# Patient Record
Sex: MALE | Race: WHITE | Employment: FULL TIME | URBAN - METROPOLITAN AREA
[De-identification: names, ages, dates, MRNs, and addresses within clinical notes are randomized per-mention and may not be internally consistent; named-entity substitution may affect disease eponyms.]

---

## 2020-09-19 ENCOUNTER — HOSPITAL ENCOUNTER (EMERGENCY)
Age: 55
Discharge: ANOTHER HOSPITAL | End: 2020-09-20
Attending: INTERNAL MEDICINE
Payer: COMMERCIAL

## 2020-09-19 ENCOUNTER — APPOINTMENT (OUTPATIENT)
Dept: CT IMAGING | Age: 55
End: 2020-09-19
Payer: COMMERCIAL

## 2020-09-19 ENCOUNTER — NURSE TRIAGE (OUTPATIENT)
Dept: OTHER | Facility: CLINIC | Age: 55
End: 2020-09-19

## 2020-09-19 LAB
ALBUMIN SERPL-MCNC: 4.7 G/DL (ref 3.5–4.6)
ALP BLD-CCNC: 103 U/L (ref 35–104)
ALT SERPL-CCNC: 28 U/L (ref 0–41)
AMYLASE: 53 U/L (ref 22–93)
ANION GAP SERPL CALCULATED.3IONS-SCNC: 11 MEQ/L (ref 9–15)
AST SERPL-CCNC: 18 U/L (ref 0–40)
BACTERIA: NEGATIVE /HPF
BASOPHILS ABSOLUTE: 0.1 K/UL (ref 0–0.2)
BASOPHILS RELATIVE PERCENT: 0.5 %
BILIRUB SERPL-MCNC: 0.4 MG/DL (ref 0.2–0.7)
BILIRUBIN URINE: NEGATIVE
BLOOD, URINE: ABNORMAL
BUN BLDV-MCNC: 19 MG/DL (ref 6–20)
CALCIUM SERPL-MCNC: 9.7 MG/DL (ref 8.5–9.9)
CHLORIDE BLD-SCNC: 103 MEQ/L (ref 95–107)
CLARITY: CLEAR
CO2: 27 MEQ/L (ref 20–31)
COLOR: YELLOW
CREAT SERPL-MCNC: 0.85 MG/DL (ref 0.7–1.2)
EKG ATRIAL RATE: 80 BPM
EKG P AXIS: 64 DEGREES
EKG P-R INTERVAL: 144 MS
EKG Q-T INTERVAL: 378 MS
EKG QRS DURATION: 94 MS
EKG QTC CALCULATION (BAZETT): 435 MS
EKG R AXIS: 42 DEGREES
EKG T AXIS: 35 DEGREES
EKG VENTRICULAR RATE: 80 BPM
EOSINOPHILS ABSOLUTE: 0.3 K/UL (ref 0–0.7)
EOSINOPHILS RELATIVE PERCENT: 3 %
EPITHELIAL CELLS, UA: NORMAL /HPF
GFR AFRICAN AMERICAN: >60
GFR NON-AFRICAN AMERICAN: >60
GLOBULIN: 2.9 G/DL (ref 2.3–3.5)
GLUCOSE BLD-MCNC: 152 MG/DL (ref 70–99)
GLUCOSE URINE: NEGATIVE MG/DL
HCT VFR BLD CALC: 42.7 % (ref 42–52)
HEMOGLOBIN: 13.8 G/DL (ref 14–18)
KETONES, URINE: NEGATIVE MG/DL
LACTIC ACID: 1.5 MMOL/L (ref 0.5–2.2)
LEUKOCYTE ESTERASE, URINE: NEGATIVE
LIPASE: 26 U/L (ref 12–95)
LYMPHOCYTES ABSOLUTE: 1.8 K/UL (ref 1–4.8)
LYMPHOCYTES RELATIVE PERCENT: 15.9 %
MCH RBC QN AUTO: 29.9 PG (ref 27–31.3)
MCHC RBC AUTO-ENTMCNC: 32.2 % (ref 33–37)
MCV RBC AUTO: 92.8 FL (ref 80–100)
MONOCYTES ABSOLUTE: 1 K/UL (ref 0.2–0.8)
MONOCYTES RELATIVE PERCENT: 8.9 %
NEUTROPHILS ABSOLUTE: 8.2 K/UL (ref 1.4–6.5)
NEUTROPHILS RELATIVE PERCENT: 71.7 %
NITRITE, URINE: NEGATIVE
PDW BLD-RTO: 14 % (ref 11.5–14.5)
PH UA: 6 (ref 5–9)
PLATELET # BLD: 313 K/UL (ref 130–400)
POTASSIUM SERPL-SCNC: 4.2 MEQ/L (ref 3.4–4.9)
PROTEIN UA: NEGATIVE MG/DL
RBC # BLD: 4.61 M/UL (ref 4.7–6.1)
RBC UA: NORMAL /HPF (ref 0–2)
SODIUM BLD-SCNC: 141 MEQ/L (ref 135–144)
SPECIFIC GRAVITY UA: 1.02 (ref 1–1.03)
TOTAL PROTEIN: 7.6 G/DL (ref 6.3–8)
TROPONIN: <0.01 NG/ML (ref 0–0.01)
URINE REFLEX TO CULTURE: ABNORMAL
UROBILINOGEN, URINE: 0.2 E.U./DL
WBC # BLD: 11.4 K/UL (ref 4.8–10.8)
WBC UA: NORMAL /HPF (ref 0–5)

## 2020-09-19 PROCEDURE — 81001 URINALYSIS AUTO W/SCOPE: CPT

## 2020-09-19 PROCEDURE — 83690 ASSAY OF LIPASE: CPT

## 2020-09-19 PROCEDURE — 83605 ASSAY OF LACTIC ACID: CPT

## 2020-09-19 PROCEDURE — 82150 ASSAY OF AMYLASE: CPT

## 2020-09-19 PROCEDURE — 93005 ELECTROCARDIOGRAM TRACING: CPT

## 2020-09-19 PROCEDURE — 74177 CT ABD & PELVIS W/CONTRAST: CPT

## 2020-09-19 PROCEDURE — 80053 COMPREHEN METABOLIC PANEL: CPT

## 2020-09-19 PROCEDURE — 2580000003 HC RX 258: Performed by: INTERNAL MEDICINE

## 2020-09-19 PROCEDURE — 99285 EMERGENCY DEPT VISIT HI MDM: CPT

## 2020-09-19 PROCEDURE — 85025 COMPLETE CBC W/AUTO DIFF WBC: CPT

## 2020-09-19 PROCEDURE — 84484 ASSAY OF TROPONIN QUANT: CPT

## 2020-09-19 PROCEDURE — 96375 TX/PRO/DX INJ NEW DRUG ADDON: CPT

## 2020-09-19 PROCEDURE — 87040 BLOOD CULTURE FOR BACTERIA: CPT

## 2020-09-19 PROCEDURE — 6360000002 HC RX W HCPCS: Performed by: INTERNAL MEDICINE

## 2020-09-19 PROCEDURE — 36415 COLL VENOUS BLD VENIPUNCTURE: CPT

## 2020-09-19 RX ORDER — FLUOXETINE HYDROCHLORIDE 40 MG/1
2 CAPSULE ORAL DAILY
COMMUNITY
Start: 2020-09-14

## 2020-09-19 RX ORDER — SODIUM CHLORIDE 0.9 % (FLUSH) 0.9 %
3 SYRINGE (ML) INJECTION EVERY 8 HOURS
Status: DISCONTINUED | OUTPATIENT
Start: 2020-09-19 | End: 2020-09-20 | Stop reason: HOSPADM

## 2020-09-19 RX ORDER — 0.9 % SODIUM CHLORIDE 0.9 %
1000 INTRAVENOUS SOLUTION INTRAVENOUS ONCE
Status: COMPLETED | OUTPATIENT
Start: 2020-09-19 | End: 2020-09-20

## 2020-09-19 RX ORDER — ONDANSETRON 2 MG/ML
4 INJECTION INTRAMUSCULAR; INTRAVENOUS ONCE
Status: COMPLETED | OUTPATIENT
Start: 2020-09-19 | End: 2020-09-19

## 2020-09-19 RX ORDER — MORPHINE SULFATE 4 MG/ML
4 INJECTION, SOLUTION INTRAMUSCULAR; INTRAVENOUS ONCE
Status: COMPLETED | OUTPATIENT
Start: 2020-09-19 | End: 2020-09-19

## 2020-09-19 RX ADMIN — Medication 3 ML: at 23:28

## 2020-09-19 RX ADMIN — ONDANSETRON 4 MG: 2 INJECTION INTRAMUSCULAR; INTRAVENOUS at 23:27

## 2020-09-19 RX ADMIN — MORPHINE SULFATE 4 MG: 4 INJECTION, SOLUTION INTRAMUSCULAR; INTRAVENOUS at 23:27

## 2020-09-19 ASSESSMENT — PAIN SCALES - GENERAL
PAINLEVEL_OUTOF10: 9
PAINLEVEL_OUTOF10: 9

## 2020-09-19 ASSESSMENT — PAIN DESCRIPTION - PAIN TYPE: TYPE: ACUTE PAIN

## 2020-09-19 ASSESSMENT — PAIN DESCRIPTION - ORIENTATION: ORIENTATION: MID

## 2020-09-19 ASSESSMENT — PAIN DESCRIPTION - LOCATION: LOCATION: ABDOMEN

## 2020-09-20 ENCOUNTER — ANESTHESIA EVENT (OUTPATIENT)
Dept: OPERATING ROOM | Age: 55
DRG: 418 | End: 2020-09-20
Payer: COMMERCIAL

## 2020-09-20 ENCOUNTER — ANESTHESIA (OUTPATIENT)
Dept: OPERATING ROOM | Age: 55
DRG: 418 | End: 2020-09-20
Payer: COMMERCIAL

## 2020-09-20 ENCOUNTER — HOSPITAL ENCOUNTER (INPATIENT)
Age: 55
LOS: 1 days | Discharge: HOME | DRG: 418 | End: 2020-09-20
Attending: INTERNAL MEDICINE | Admitting: INTERNAL MEDICINE
Payer: COMMERCIAL

## 2020-09-20 ENCOUNTER — APPOINTMENT (OUTPATIENT)
Dept: GENERAL RADIOLOGY | Age: 55
DRG: 418 | End: 2020-09-20
Attending: INTERNAL MEDICINE
Payer: COMMERCIAL

## 2020-09-20 VITALS
TEMPERATURE: 100.1 F | WEIGHT: 313 LBS | DIASTOLIC BLOOD PRESSURE: 85 MMHG | BODY MASS INDEX: 41.48 KG/M2 | RESPIRATION RATE: 12 BRPM | HEART RATE: 96 BPM | OXYGEN SATURATION: 93 % | SYSTOLIC BLOOD PRESSURE: 191 MMHG | HEIGHT: 73 IN

## 2020-09-20 VITALS
HEIGHT: 73 IN | RESPIRATION RATE: 20 BRPM | HEART RATE: 84 BPM | SYSTOLIC BLOOD PRESSURE: 150 MMHG | BODY MASS INDEX: 39.76 KG/M2 | OXYGEN SATURATION: 94 % | TEMPERATURE: 98.4 F | DIASTOLIC BLOOD PRESSURE: 97 MMHG | WEIGHT: 300 LBS

## 2020-09-20 VITALS — OXYGEN SATURATION: 93 % | DIASTOLIC BLOOD PRESSURE: 85 MMHG | SYSTOLIC BLOOD PRESSURE: 130 MMHG | TEMPERATURE: 99.3 F

## 2020-09-20 PROBLEM — K81.0 ACUTE CHOLECYSTITIS: Status: ACTIVE | Noted: 2020-09-20

## 2020-09-20 LAB — SARS-COV-2, NAAT: NOT DETECTED

## 2020-09-20 PROCEDURE — 0FT44ZZ RESECTION OF GALLBLADDER, PERCUTANEOUS ENDOSCOPIC APPROACH: ICD-10-PCS | Performed by: INTERNAL MEDICINE

## 2020-09-20 PROCEDURE — 1210000000 HC MED SURG R&B

## 2020-09-20 PROCEDURE — 7100000001 HC PACU RECOVERY - ADDTL 15 MIN: Performed by: COLON & RECTAL SURGERY

## 2020-09-20 PROCEDURE — 6370000000 HC RX 637 (ALT 250 FOR IP): Performed by: COLON & RECTAL SURGERY

## 2020-09-20 PROCEDURE — 96365 THER/PROPH/DIAG IV INF INIT: CPT

## 2020-09-20 PROCEDURE — 74300 X-RAY BILE DUCTS/PANCREAS: CPT

## 2020-09-20 PROCEDURE — 6360000002 HC RX W HCPCS

## 2020-09-20 PROCEDURE — 7100000000 HC PACU RECOVERY - FIRST 15 MIN: Performed by: COLON & RECTAL SURGERY

## 2020-09-20 PROCEDURE — C1758 CATHETER, URETERAL: HCPCS | Performed by: COLON & RECTAL SURGERY

## 2020-09-20 PROCEDURE — BF121ZZ FLUOROSCOPY OF GALLBLADDER USING LOW OSMOLAR CONTRAST: ICD-10-PCS | Performed by: INTERNAL MEDICINE

## 2020-09-20 PROCEDURE — 47563 LAPARO CHOLECYSTECTOMY/GRAPH: CPT | Performed by: COLON & RECTAL SURGERY

## 2020-09-20 PROCEDURE — 2709999900 HC NON-CHARGEABLE SUPPLY: Performed by: COLON & RECTAL SURGERY

## 2020-09-20 PROCEDURE — 87040 BLOOD CULTURE FOR BACTERIA: CPT

## 2020-09-20 PROCEDURE — 6360000002 HC RX W HCPCS: Performed by: ANESTHESIOLOGY

## 2020-09-20 PROCEDURE — 6360000004 HC RX CONTRAST MEDICATION: Performed by: INTERNAL MEDICINE

## 2020-09-20 PROCEDURE — 2580000003 HC RX 258: Performed by: COLON & RECTAL SURGERY

## 2020-09-20 PROCEDURE — 6370000000 HC RX 637 (ALT 250 FOR IP)

## 2020-09-20 PROCEDURE — 3600000014 HC SURGERY LEVEL 4 ADDTL 15MIN: Performed by: COLON & RECTAL SURGERY

## 2020-09-20 PROCEDURE — G0378 HOSPITAL OBSERVATION PER HR: HCPCS

## 2020-09-20 PROCEDURE — U0002 COVID-19 LAB TEST NON-CDC: HCPCS

## 2020-09-20 PROCEDURE — 6360000004 HC RX CONTRAST MEDICATION: Performed by: COLON & RECTAL SURGERY

## 2020-09-20 PROCEDURE — 3700000000 HC ANESTHESIA ATTENDED CARE: Performed by: COLON & RECTAL SURGERY

## 2020-09-20 PROCEDURE — 3700000001 HC ADD 15 MINUTES (ANESTHESIA): Performed by: COLON & RECTAL SURGERY

## 2020-09-20 PROCEDURE — 6360000002 HC RX W HCPCS: Performed by: INTERNAL MEDICINE

## 2020-09-20 PROCEDURE — 2580000003 HC RX 258: Performed by: INTERNAL MEDICINE

## 2020-09-20 PROCEDURE — 88304 TISSUE EXAM BY PATHOLOGIST: CPT

## 2020-09-20 PROCEDURE — 96376 TX/PRO/DX INJ SAME DRUG ADON: CPT

## 2020-09-20 PROCEDURE — 3600000004 HC SURGERY LEVEL 4 BASE: Performed by: COLON & RECTAL SURGERY

## 2020-09-20 PROCEDURE — 2500000003 HC RX 250 WO HCPCS: Performed by: ANESTHESIOLOGY

## 2020-09-20 PROCEDURE — G0379 DIRECT REFER HOSPITAL OBSERV: HCPCS

## 2020-09-20 PROCEDURE — 99253 IP/OBS CNSLTJ NEW/EST LOW 45: CPT | Performed by: COLON & RECTAL SURGERY

## 2020-09-20 PROCEDURE — 76942 ECHO GUIDE FOR BIOPSY: CPT | Performed by: ANESTHESIOLOGY

## 2020-09-20 PROCEDURE — 93010 ELECTROCARDIOGRAM REPORT: CPT | Performed by: INTERNAL MEDICINE

## 2020-09-20 PROCEDURE — 96375 TX/PRO/DX INJ NEW DRUG ADDON: CPT

## 2020-09-20 RX ORDER — BUPIVACAINE HYDROCHLORIDE 2.5 MG/ML
INJECTION, SOLUTION EPIDURAL; INFILTRATION; INTRACAUDAL PRN
Status: DISCONTINUED | OUTPATIENT
Start: 2020-09-20 | End: 2020-09-20 | Stop reason: SDUPTHER

## 2020-09-20 RX ORDER — FENTANYL CITRATE 50 UG/ML
25 INJECTION, SOLUTION INTRAMUSCULAR; INTRAVENOUS EVERY 10 MIN PRN
Status: DISCONTINUED | OUTPATIENT
Start: 2020-09-20 | End: 2020-09-20 | Stop reason: HOSPADM

## 2020-09-20 RX ORDER — SODIUM CHLORIDE, SODIUM LACTATE, POTASSIUM CHLORIDE, CALCIUM CHLORIDE 600; 310; 30; 20 MG/100ML; MG/100ML; MG/100ML; MG/100ML
INJECTION, SOLUTION INTRAVENOUS CONTINUOUS
Status: DISCONTINUED | OUTPATIENT
Start: 2020-09-20 | End: 2020-09-20 | Stop reason: HOSPADM

## 2020-09-20 RX ORDER — METOCLOPRAMIDE HYDROCHLORIDE 5 MG/ML
10 INJECTION INTRAMUSCULAR; INTRAVENOUS
Status: DISCONTINUED | OUTPATIENT
Start: 2020-09-20 | End: 2020-09-20 | Stop reason: HOSPADM

## 2020-09-20 RX ORDER — HYDROCODONE BITARTRATE AND ACETAMINOPHEN 5; 325 MG/1; MG/1
1 TABLET ORAL PRN
Status: DISCONTINUED | OUTPATIENT
Start: 2020-09-20 | End: 2020-09-20 | Stop reason: HOSPADM

## 2020-09-20 RX ORDER — MIDAZOLAM HYDROCHLORIDE 1 MG/ML
INJECTION INTRAMUSCULAR; INTRAVENOUS
Status: DISCONTINUED
Start: 2020-09-20 | End: 2020-09-20 | Stop reason: HOSPADM

## 2020-09-20 RX ORDER — DEXAMETHASONE SODIUM PHOSPHATE 4 MG/ML
INJECTION, SOLUTION INTRA-ARTICULAR; INTRALESIONAL; INTRAMUSCULAR; INTRAVENOUS; SOFT TISSUE PRN
Status: DISCONTINUED | OUTPATIENT
Start: 2020-09-20 | End: 2020-09-20 | Stop reason: SDUPTHER

## 2020-09-20 RX ORDER — HYDROCODONE BITARTRATE AND ACETAMINOPHEN 5; 325 MG/1; MG/1
2 TABLET ORAL PRN
Status: DISCONTINUED | OUTPATIENT
Start: 2020-09-20 | End: 2020-09-20 | Stop reason: HOSPADM

## 2020-09-20 RX ORDER — LIDOCAINE HYDROCHLORIDE AND EPINEPHRINE BITARTRATE 20; .01 MG/ML; MG/ML
INJECTION, SOLUTION SUBCUTANEOUS PRN
Status: DISCONTINUED | OUTPATIENT
Start: 2020-09-20 | End: 2020-09-20 | Stop reason: SDUPTHER

## 2020-09-20 RX ORDER — OXYCODONE HYDROCHLORIDE AND ACETAMINOPHEN 5; 325 MG/1; MG/1
2 TABLET ORAL EVERY 4 HOURS PRN
Status: DISCONTINUED | OUTPATIENT
Start: 2020-09-20 | End: 2020-09-20 | Stop reason: HOSPADM

## 2020-09-20 RX ORDER — ONDANSETRON 2 MG/ML
4 INJECTION INTRAMUSCULAR; INTRAVENOUS
Status: DISCONTINUED | OUTPATIENT
Start: 2020-09-20 | End: 2020-09-20 | Stop reason: HOSPADM

## 2020-09-20 RX ORDER — ONDANSETRON 2 MG/ML
4 INJECTION INTRAMUSCULAR; INTRAVENOUS ONCE
Status: COMPLETED | OUTPATIENT
Start: 2020-09-20 | End: 2020-09-20

## 2020-09-20 RX ORDER — MORPHINE SULFATE 4 MG/ML
INJECTION, SOLUTION INTRAMUSCULAR; INTRAVENOUS
Status: COMPLETED
Start: 2020-09-20 | End: 2020-09-20

## 2020-09-20 RX ORDER — ROCURONIUM BROMIDE 10 MG/ML
INJECTION, SOLUTION INTRAVENOUS PRN
Status: DISCONTINUED | OUTPATIENT
Start: 2020-09-20 | End: 2020-09-20 | Stop reason: SDUPTHER

## 2020-09-20 RX ORDER — DIPHENHYDRAMINE HYDROCHLORIDE 50 MG/ML
12.5 INJECTION INTRAMUSCULAR; INTRAVENOUS
Status: DISCONTINUED | OUTPATIENT
Start: 2020-09-20 | End: 2020-09-20 | Stop reason: HOSPADM

## 2020-09-20 RX ORDER — LIDOCAINE HYDROCHLORIDE 20 MG/ML
INJECTION, SOLUTION INTRAVENOUS PRN
Status: DISCONTINUED | OUTPATIENT
Start: 2020-09-20 | End: 2020-09-20 | Stop reason: SDUPTHER

## 2020-09-20 RX ORDER — FLUOXETINE HYDROCHLORIDE 20 MG/1
80 CAPSULE ORAL DAILY
Status: DISCONTINUED | OUTPATIENT
Start: 2020-09-20 | End: 2020-09-20 | Stop reason: HOSPADM

## 2020-09-20 RX ORDER — MIDAZOLAM HYDROCHLORIDE 1 MG/ML
INJECTION INTRAMUSCULAR; INTRAVENOUS PRN
Status: DISCONTINUED | OUTPATIENT
Start: 2020-09-20 | End: 2020-09-20 | Stop reason: SDUPTHER

## 2020-09-20 RX ORDER — PROPOFOL 10 MG/ML
INJECTION, EMULSION INTRAVENOUS PRN
Status: DISCONTINUED | OUTPATIENT
Start: 2020-09-20 | End: 2020-09-20 | Stop reason: SDUPTHER

## 2020-09-20 RX ORDER — KETOROLAC TROMETHAMINE 30 MG/ML
30 INJECTION, SOLUTION INTRAMUSCULAR; INTRAVENOUS EVERY 6 HOURS PRN
Status: DISCONTINUED | OUTPATIENT
Start: 2020-09-22 | End: 2020-09-20 | Stop reason: HOSPADM

## 2020-09-20 RX ORDER — ONDANSETRON 2 MG/ML
INJECTION INTRAMUSCULAR; INTRAVENOUS PRN
Status: DISCONTINUED | OUTPATIENT
Start: 2020-09-20 | End: 2020-09-20 | Stop reason: SDUPTHER

## 2020-09-20 RX ORDER — MAGNESIUM HYDROXIDE 1200 MG/15ML
LIQUID ORAL CONTINUOUS PRN
Status: COMPLETED | OUTPATIENT
Start: 2020-09-20 | End: 2020-09-20

## 2020-09-20 RX ORDER — MORPHINE SULFATE 4 MG/ML
4 INJECTION, SOLUTION INTRAMUSCULAR; INTRAVENOUS ONCE
Status: COMPLETED | OUTPATIENT
Start: 2020-09-20 | End: 2020-09-20

## 2020-09-20 RX ORDER — CIPROFLOXACIN 2 MG/ML
400 INJECTION, SOLUTION INTRAVENOUS ONCE
Status: DISCONTINUED | OUTPATIENT
Start: 2020-09-20 | End: 2020-09-20

## 2020-09-20 RX ORDER — MORPHINE SULFATE 2 MG/ML
2 INJECTION, SOLUTION INTRAMUSCULAR; INTRAVENOUS EVERY 4 HOURS PRN
Status: DISCONTINUED | OUTPATIENT
Start: 2020-09-20 | End: 2020-09-20 | Stop reason: HOSPADM

## 2020-09-20 RX ORDER — AMOXICILLIN AND CLAVULANATE POTASSIUM 500; 125 MG/1; MG/1
1 TABLET, FILM COATED ORAL 3 TIMES DAILY
Qty: 30 TABLET | Refills: 0 | Status: SHIPPED | OUTPATIENT
Start: 2020-09-20 | End: 2020-09-30

## 2020-09-20 RX ORDER — OXYCODONE HYDROCHLORIDE AND ACETAMINOPHEN 5; 325 MG/1; MG/1
1 TABLET ORAL EVERY 4 HOURS PRN
Status: DISCONTINUED | OUTPATIENT
Start: 2020-09-20 | End: 2020-09-20 | Stop reason: HOSPADM

## 2020-09-20 RX ORDER — ONDANSETRON 2 MG/ML
4 INJECTION INTRAMUSCULAR; INTRAVENOUS EVERY 6 HOURS PRN
Status: DISCONTINUED | OUTPATIENT
Start: 2020-09-20 | End: 2020-09-20 | Stop reason: HOSPADM

## 2020-09-20 RX ORDER — OXYCODONE HYDROCHLORIDE AND ACETAMINOPHEN 5; 325 MG/1; MG/1
1 TABLET ORAL EVERY 6 HOURS PRN
Qty: 12 TABLET | Refills: 0 | Status: SHIPPED | OUTPATIENT
Start: 2020-09-20 | End: 2020-09-23

## 2020-09-20 RX ORDER — MEPERIDINE HYDROCHLORIDE 25 MG/ML
12.5 INJECTION INTRAMUSCULAR; INTRAVENOUS; SUBCUTANEOUS EVERY 5 MIN PRN
Status: DISCONTINUED | OUTPATIENT
Start: 2020-09-20 | End: 2020-09-20 | Stop reason: HOSPADM

## 2020-09-20 RX ADMIN — MORPHINE SULFATE 2 MG: 2 INJECTION, SOLUTION INTRAMUSCULAR; INTRAVENOUS at 09:10

## 2020-09-20 RX ADMIN — ONDANSETRON 4 MG: 2 INJECTION INTRAMUSCULAR; INTRAVENOUS at 13:43

## 2020-09-20 RX ADMIN — LIDOCAINE HYDROCHLORIDE,EPINEPHRINE BITARTRATE 15 ML: 20; .01 INJECTION, SOLUTION INFILTRATION; PERINEURAL at 11:54

## 2020-09-20 RX ADMIN — DEXAMETHASONE SODIUM PHOSPHATE 4 MG: 4 INJECTION INTRA-ARTICULAR; INTRALESIONAL; INTRAMUSCULAR; INTRAVENOUS; SOFT TISSUE at 11:54

## 2020-09-20 RX ADMIN — SODIUM CHLORIDE 1000 ML: 9 INJECTION, SOLUTION INTRAVENOUS at 00:47

## 2020-09-20 RX ADMIN — LIDOCAINE HYDROCHLORIDE 40 MG: 20 INJECTION, SOLUTION INTRAVENOUS at 12:55

## 2020-09-20 RX ADMIN — IOPAMIDOL 100 ML: 755 INJECTION, SOLUTION INTRAVENOUS at 00:07

## 2020-09-20 RX ADMIN — MIDAZOLAM HYDROCHLORIDE 2 MG: 2 INJECTION, SOLUTION INTRAMUSCULAR; INTRAVENOUS at 11:52

## 2020-09-20 RX ADMIN — HYDROMORPHONE HYDROCHLORIDE 0.5 MG: 1 INJECTION, SOLUTION INTRAMUSCULAR; INTRAVENOUS; SUBCUTANEOUS at 01:35

## 2020-09-20 RX ADMIN — PROPOFOL 200 MG: 10 INJECTION, EMULSION INTRAVENOUS at 12:55

## 2020-09-20 RX ADMIN — BUPIVACAINE HYDROCHLORIDE 30 ML: 2.5 INJECTION, SOLUTION EPIDURAL; INFILTRATION; INTRACAUDAL at 11:54

## 2020-09-20 RX ADMIN — DEXAMETHASONE SODIUM PHOSPHATE 4 MG: 4 INJECTION INTRA-ARTICULAR; INTRALESIONAL; INTRAMUSCULAR; INTRAVENOUS; SOFT TISSUE at 13:16

## 2020-09-20 RX ADMIN — MIDAZOLAM HYDROCHLORIDE 2 MG: 2 INJECTION, SOLUTION INTRAMUSCULAR; INTRAVENOUS at 11:47

## 2020-09-20 RX ADMIN — ROCURONIUM BROMIDE 50 MG: 10 INJECTION INTRAVENOUS at 12:55

## 2020-09-20 RX ADMIN — PIPERACILLIN SODIUM AND TAZOBACTAM SODIUM 3.38 G: 3; .375 INJECTION, POWDER, LYOPHILIZED, FOR SOLUTION INTRAVENOUS at 11:07

## 2020-09-20 RX ADMIN — PIPERACILLIN AND TAZOBACTAM 3.38 G: 3; .375 INJECTION, POWDER, FOR SOLUTION INTRAVENOUS at 01:18

## 2020-09-20 RX ADMIN — FENTANYL CITRATE 25 MCG: 50 INJECTION, SOLUTION INTRAMUSCULAR; INTRAVENOUS at 14:51

## 2020-09-20 RX ADMIN — MORPHINE SULFATE 2 MG: 2 INJECTION, SOLUTION INTRAMUSCULAR; INTRAVENOUS at 04:54

## 2020-09-20 RX ADMIN — MORPHINE SULFATE 4 MG: 4 INJECTION, SOLUTION INTRAMUSCULAR; INTRAVENOUS at 00:47

## 2020-09-20 RX ADMIN — ONDANSETRON 4 MG: 2 INJECTION INTRAMUSCULAR; INTRAVENOUS at 00:47

## 2020-09-20 RX ADMIN — SODIUM CHLORIDE, POTASSIUM CHLORIDE, SODIUM LACTATE AND CALCIUM CHLORIDE: 600; 310; 30; 20 INJECTION, SOLUTION INTRAVENOUS at 12:47

## 2020-09-20 RX ADMIN — SODIUM CHLORIDE, POTASSIUM CHLORIDE, SODIUM LACTATE AND CALCIUM CHLORIDE: 600; 310; 30; 20 INJECTION, SOLUTION INTRAVENOUS at 04:55

## 2020-09-20 ASSESSMENT — PULMONARY FUNCTION TESTS
PIF_VALUE: 0
PIF_VALUE: 18
PIF_VALUE: 30
PIF_VALUE: 3
PIF_VALUE: 20
PIF_VALUE: 29
PIF_VALUE: 32
PIF_VALUE: 30
PIF_VALUE: 15
PIF_VALUE: 30
PIF_VALUE: 30
PIF_VALUE: 0
PIF_VALUE: 29
PIF_VALUE: 18
PIF_VALUE: 22
PIF_VALUE: 1
PIF_VALUE: 28
PIF_VALUE: 29
PIF_VALUE: 31
PIF_VALUE: 29
PIF_VALUE: 28
PIF_VALUE: 28
PIF_VALUE: 21
PIF_VALUE: 35
PIF_VALUE: 30
PIF_VALUE: 33
PIF_VALUE: 27
PIF_VALUE: 28
PIF_VALUE: 18
PIF_VALUE: 31
PIF_VALUE: 0
PIF_VALUE: 4
PIF_VALUE: 0
PIF_VALUE: 29
PIF_VALUE: 31
PIF_VALUE: 19
PIF_VALUE: 1
PIF_VALUE: 0
PIF_VALUE: 32
PIF_VALUE: 32
PIF_VALUE: 31
PIF_VALUE: 20
PIF_VALUE: 32
PIF_VALUE: 26
PIF_VALUE: 17
PIF_VALUE: 1
PIF_VALUE: 31
PIF_VALUE: 28
PIF_VALUE: 31
PIF_VALUE: 0
PIF_VALUE: 0
PIF_VALUE: 29
PIF_VALUE: 23
PIF_VALUE: 18
PIF_VALUE: 29
PIF_VALUE: 35
PIF_VALUE: 18
PIF_VALUE: 30
PIF_VALUE: 13
PIF_VALUE: 1
PIF_VALUE: 18
PIF_VALUE: 30
PIF_VALUE: 31
PIF_VALUE: 31
PIF_VALUE: 29
PIF_VALUE: 32
PIF_VALUE: 5
PIF_VALUE: 32
PIF_VALUE: 0
PIF_VALUE: 32
PIF_VALUE: 30
PIF_VALUE: 28
PIF_VALUE: 32
PIF_VALUE: 26
PIF_VALUE: 31
PIF_VALUE: 28
PIF_VALUE: 31
PIF_VALUE: 33
PIF_VALUE: 29

## 2020-09-20 ASSESSMENT — ENCOUNTER SYMPTOMS
VOMITING: 1
EYE DISCHARGE: 0
APNEA: 0
ABDOMINAL PAIN: 1
NAUSEA: 1

## 2020-09-20 ASSESSMENT — PAIN SCALES - GENERAL
PAINLEVEL_OUTOF10: 10
PAINLEVEL_OUTOF10: 4
PAINLEVEL_OUTOF10: 4
PAINLEVEL_OUTOF10: 9
PAINLEVEL_OUTOF10: 9
PAINLEVEL_OUTOF10: 0
PAINLEVEL_OUTOF10: 9

## 2020-09-20 ASSESSMENT — PAIN DESCRIPTION - PAIN TYPE: TYPE: SURGICAL PAIN

## 2020-09-20 ASSESSMENT — PAIN - FUNCTIONAL ASSESSMENT: PAIN_FUNCTIONAL_ASSESSMENT: 0-10

## 2020-09-20 ASSESSMENT — PAIN DESCRIPTION - LOCATION: LOCATION: ABDOMEN

## 2020-09-20 ASSESSMENT — PAIN DESCRIPTION - ORIENTATION: ORIENTATION: RIGHT

## 2020-09-20 NOTE — PROGRESS NOTES
Agree with plan of care as documented by my colleague today at 4:44 AM.  Patient was taken to the OR for cholecystectomy. We will continue to follow tomorrow.

## 2020-09-20 NOTE — PROGRESS NOTES
Pt in bed resting. Admission documented. Medicated for pain per MAR. Oriented to room. VSS. No sx of distressed noted at this time. A&O*4. Ambulating well in to bed. No needs at this time. Will continue to monitor.  Electronically signed by El Fonseca RN on 9/20/2020 at 5:37 AM

## 2020-09-20 NOTE — PLAN OF CARE
Problem: Pain:  Goal: Pain level will decrease  Description: Pain level will decrease  9/20/2020 0827 by Wes Hobson RN  Outcome: Ongoing  9/20/2020 0539 by Thalia Elaine RN  Outcome: Ongoing  Goal: Control of acute pain  Description: Control of acute pain  9/20/2020 0539 by Thalia Elaine RN  Outcome: Ongoing  Goal: Control of chronic pain  Description: Control of chronic pain  9/20/2020 0827 by Wes Hobson RN  Outcome: Ongoing  9/20/2020 0539 by Thalia Elaine RN  Outcome: Ongoing

## 2020-09-20 NOTE — PROGRESS NOTES
After appropriate time out, patient was assisted to sit on edge of bed. Appropriate monitors were applied, as well as nasal 02 @ 3 LPM.  Dr. Cortez Lang then performed bilateral erector spinae plane blocks. Patient tolerated well, remaining fairly awake despite IV Versed sedation. Pulse oximeter readings remained 96 - 98 % throughout, as well as HR in the 90's - 100 throughout. Continuing to monitor while awaiting OR.

## 2020-09-20 NOTE — CONSULTS
Department of General Surgery - Adult  Surgical Service general surgery  Attending Consult Note      Reason for Consult: Acute cholecystitis      CHIEF COMPLAINT: Right upper quadrant pain    History Obtained From:  patient, electronic medical record    HISTORY OF PRESENT ILLNESS:                The patient is a 50 y.o. male who presents with right upper quadrant pain for the past few days it started periumbilically and has. Radiated up to the right upper quadrant. He was seen at [facility] emergency room last night. A CAT scan showed a large gallstone along with a distended gallbladder consistent with acute cholecystitis. He has had no abdominal operations in the past.  He complains of nausea and vomiting last night. He is otherwise healthy. He has a history of joint replacement. He has had kidney stones in the past.        Past Medical History:        Diagnosis Date    Kidney stone      Past Surgical History:        Procedure Laterality Date    JOINT REPLACEMENT Bilateral     knees, left hip     Current Medications:   Current Facility-Administered Medications: ondansetron (ZOFRAN) injection 4 mg, 4 mg, Intravenous, Q6H PRN  piperacillin-tazobactam (ZOSYN) 3.375 g in dextrose 5 % 50 mL IVPB extended infusion (mini-bag), 3.375 g, Intravenous, Q8H  morphine (PF) injection 2 mg, 2 mg, Intravenous, Q4H PRN  lactated ringers infusion, , Intravenous, Continuous  enoxaparin (LOVENOX) injection 40 mg, 40 mg, Subcutaneous, Daily  Allergies:  Patient has no known allergies. Social History:   TOBACCO:   reports that he has never smoked. He has quit using smokeless tobacco.  His smokeless tobacco use included chew. ETOH:   reports previous alcohol use. DRUGS:   reports no history of drug use. Family History:   No family history on file.     REVIEW OF SYSTEMS:    CONSTITUTIONAL:  positive for  malaise  EYES:  negative  HEENT:  negative  RESPIRATORY:  negative  CARDIOVASCULAR:  negative  GASTROINTESTINAL:  positive LABGLOM >60.0 09/19/2020    GLUCOSE 152 09/19/2020    PROT 7.6 09/19/2020    LABALBU 4.7 09/19/2020    CALCIUM 9.7 09/19/2020    BILITOT 0.4 09/19/2020    ALKPHOS 103 09/19/2020    AST 18 09/19/2020    ALT 28 09/19/2020     Radiology Review: CAT scan reviewed as described above    IMPRESSION/RECOMMENDATIONS:      Acute cholecystitis secondary to gallstones. Ultrasound would not give me any additional information required for operative planning. Risks and benefits of laparoscopic possible open cholecystectomy described. Risks of the procedure including infection, bleeding, damage to the common bile duct, bile leak reoperation and failure to relieve symptoms all addressed. His wife was present during our discussion. Consent was obtained. All questions were answered. Plan for OR later this morning for cholecystectomy    The patient was counseled at length about the risks of brisa Covid-19 during their perioperative period and any recovery window from their procedure. The patient was made aware that brisa Covid-19  may worsen their prognosis for recovering from their procedure  and lend to a higher morbidity and/or mortality risk. All material risks, benefits, and reasonable alternatives including postponing the procedure were discussed. The patient does wish to proceed with the procedure at this time.

## 2020-09-20 NOTE — BRIEF OP NOTE
Brief Postoperative Note      Patient: Gini Azar  YOB: 1965  MRN: [id number]    Date of Procedure: 9/20/2020    Pre-Op Diagnosis: Acute cholecystitis    Post-Op Diagnosis: Same       Procedure(s):  CHOLECYSTECTOMY LAPAROSCOPIC WITH INTROPERTIVE CHOLANGIOGRAM    Surgeon(s):  Anand Watson MD    Assistant:  First Assistant: Deedee Magana    Anesthesia: General    Estimated Blood Loss (mL): 50    Complications: None    Specimens:   ID Type Source Tests Collected by Time Destination   A :  Tissue Gallbladder SURGICAL PATHOLOGY Anand Watson MD 9/20/2020 1324        Implants:  * No implants in log *      Drains: * No LDAs found *    Findings: Acute cholecystitis, intraoperative cholangiogram unremarkable    Electronically signed by Tiburcio Houser MD on 9/20/2020 at 2:11 PM

## 2020-09-20 NOTE — DISCHARGE SUMMARY
Hospital Medicine Discharge Summary    Collette Fireman  :  1965  MRN:  [id number]    Admit date:  2020  Discharge date:  2020    Admitting Physician:  Tono Qiu MD  Primary Care Physician:  Prateek Katz MD      Discharge Diagnoses:      Acute cholecystitis  Mild diverticulitis       Hospital Course:       Procedure(s):  CHOLECYSTECTOMY LAPAROSCOPIC WITH INTROPERTIVE CHOLANGIOGRAM     Surgeon(s):  Mario Briceno MD    IV abx    dc'ed on augmentin given possible mild acute diverticulitis. Pt is to follow up with surgery as outpatient. Exam on discharge:   BP (!) 191/85   Pulse 96   Temp 100.1 °F (37.8 °C) (Temporal)   Resp 12   Ht 6' 1\" (1.854 m)   Wt (!) 313 lb (142 kg)   SpO2 93%   BMI 41.30 kg/m²   See h/p note from Dr Mario Briceno     Patient was seen by the following consultants while admitted to [facility]:   Consults:  [facility]    Significant Diagnostic Studies:    Refer to chart     Please refer to chart if no studies are shown here    Ct Abdomen Pelvis W Iv Contrast Additional Contrast? None    Result Date: 2020  EXAM:  CT ABDOMEN PELVIS W IV CONTRAST History: Abdominal pain. Epigastric pain. Vomiting. Technique: Multiple contiguous axial images were obtained of the abdomen and pelvis from an level of the lung bases through the ischial tuberosities with contrast. Multiplanar reformats were obtained. Delayed images were obtained. Comparison: CT abdomen pelvis from 2016 Findings: Lung bases are clear. Evidence of coronary artery disease. The liver is enlarged measuring 24 cm in craniocaudal length. The gallbladder is mildly distended. A partially calcified 2.4 cm gallstone is identified within the lumen of the gallbladder. No gallbladder wall thickening or pericholecystic fluid. The pancreas, spleen, stomach, and adrenal glands are within normal limits. The kidneys enhance uniformly.  Multiple left renal calculi identified, largest measuring approximately 9 mm. A punctate nonobstructing right renal calculus is identified. No hydronephrosis. Urinary bladder is well distended. The prostate is enlarged. Abdominal aorta is nonaneurysmal  . No retroperitoneal or abdominal/pelvic lymphadenopathy. No small bowel obstruction. Colonic diverticuli are identified, most significantly involving the sigmoid colon. There is wall thickening and minimal pericolic inflammation of the sigmoid colon suggesting diverticulitis. Appendix is within normal limits. No free fluid or free air. Postsurgical changes of left hip arthroplasty. Degenerative changes of the spine. Suggestion of mild sigmoid colon diverticulitis. No perforation or abscess formation. Nonspecific mild dilation of the gallbladder which contains a 2.4 cm partially calcified gallstone. No gallbladder wall thickening or pericholecystic fluid. Nonobstructing bilateral renal calculi. Mildly enlarged prostate. All CT scans at this facility use dose modulation, iterative reconstruction, and/or weight based dosing when appropriate to reduce radiation dose to as low as reasonably achievable. Fl Cholangiogram Or    Result Date: 9/20/2020  FL CHOLANGIOGRAM OR : 9/20/2020 1:24 PM CLINICAL HISTORY: Acute cholecystitis. COMPARISON: None available. Intraoperative fluoroscopy was provided for Dr. Caty Brown procedure. A total of 16.573 mGy of fluoroscopy was used, with 1 fluoroscopic still saved. No diagnostic images were obtained. Please see Dr. Caty Brown surgical notes for complete details.        Discharge Medications:       [facility] Medication Instructions HVY:[id number]    Printed on:09/20/20 1811   Medication Information                      amoxicillin-clavulanate (AUGMENTIN) 500-125 MG per tablet  Take 1 tablet by mouth 3 times daily for 10 days             FLUoxetine (PROZAC) 40 MG capsule  Take 2 capsules by mouth daily             oxyCODONE-acetaminophen (PERCOCET) 5-325 MG per tablet  Take 1 tablet by mouth every 6 hours as needed for Pain for up to 3 days. Intended supply: 3 days. Take lowest dose possible to manage pain                 Disposition:   If discharged to Home, Any [facility] needs that were indicated and/or required as been addressed and set up by Social Work. Condition at discharge: good     Activity: activity as tolerated    Total time taken for discharging this patient: 40 minutes. Greater than 70% of time was spent focused exclusively on this patient. Time was taken to review chart, discuss plans with consultants, reconciling medications, discussing plan answering questions with patient.      Ange Nuñez  9/20/2020, 6:11 PM  ----------------------------------------------------------------------------------------------------------------------    Chevy Ibanez

## 2020-09-20 NOTE — TELEPHONE ENCOUNTER
Reason for Disposition   [1] SEVERE pain (e.g., excruciating) AND [2] present > 1 hour    Answer Assessment - Initial Assessment Questions  1. LOCATION: \"Where does it hurt? \"       Center abdomen below the rib cage   2. RADIATION: \"Does the pain shoot anywhere else? \" (e.g., chest, back)    None  3. ONSET: \"When did the pain begin? \" (Minutes, hours or days ago)       1-2 pm   4. SUDDEN: \"Gradual or sudden onset? \"     Sudden   5. PATTERN \"Does the pain come and go, or is it constant? \"     - If constant: \"Is it getting better, staying the same, or worsening? \"       (Note: Constant means the pain never goes away completely; most serious pain is constant and it progresses)      - If intermittent: \"How long does it last?\" \"Do you have pain now? \"      (Note: Intermittent means the pain goes away completely between bouts)      Constant   6. SEVERITY: \"How bad is the pain? \"  (e.g., Scale 1-10; mild, moderate, or severe)     - MILD (1-3): doesn't interfere with normal activities, abdomen soft and not tender to touch      - MODERATE (4-7): interferes with normal activities or awakens from sleep, tender to touch      - SEVERE (8-10): excruciating pain, doubled over, unable to do any normal activities        9/10  7. RECURRENT SYMPTOM: \"Have you ever had this type of abdominal pain before? \" If so, ask: \"When was the last time? \" and \"What happened that time? \"       No  8. CAUSE: \"What do you think is causing the abdominal pain? \"      Unsure   9. RELIEVING/AGGRAVATING FACTORS: \"What makes it better or worse? \" (e.g., movement, antacids, bowel movement)      Vomiting   10. OTHER SYMPTOMS: \"Has there been any vomiting, diarrhea, constipation, or urine problems? \"       Vomiting     Caller stated he developed abdominal pain this afternoon around 1-2 pm and took some pepto-bismol, which mildly helped but the pain returned after he got home.  The pain has increased in the last three hours, which led to vomiting and the vomiting helped slightly.     Protocols used: ABDOMINAL PAIN - MALE-ADULT-AH

## 2020-09-20 NOTE — ANESTHESIA POSTPROCEDURE EVALUATION
Department of Anesthesiology  Postprocedure Note    Patient: Germain Mckeon  MRN: [id number]  YOB: 1965  Date of evaluation: 9/20/2020  Time:  2:12 PM     Procedure Summary     Date:  09/20/20 Room / Location:  [address]    Anesthesia Start:  8380 Anesthesia Stop:  7629    Procedure:  CHOLECYSTECTOMY LAPAROSCOPIC WITH INTROPERTIVE CHOLANGIOGRAM (N/A ) Diagnosis:  (Gallbladder)    Surgeon:  Padmini Cobian MD Responsible Provider:  Ruslan Parikh MD    Anesthesia Type:  general, regional ASA Status:  2 - Emergent          Anesthesia Type: general, regional    Prieto Phase I:      Prieto Phase II:      Last vitals: Reviewed and per EMR flowsheets.        Anesthesia Post Evaluation    Patient location during evaluation: PACU  Patient participation: complete - patient participated  Level of consciousness: awake and alert  Pain score: 0  Airway patency: patent  Nausea & Vomiting: no vomiting and no nausea  Complications: no  Cardiovascular status: hemodynamically stable  Respiratory status: face mask  Hydration status: stable

## 2020-09-20 NOTE — PROGRESS NOTES
Report called to [facility]. Patient is very talkative, requesting food & oral intake. Condition is stable; will arrange for transport to return to 4 room. Has been taking ice chips & sips of H20 in PACU; will progress to sips of H20 now.

## 2020-09-20 NOTE — PROGRESS NOTES
Pt assessment and vitals complete and stable. Patient up to chair independently. Pt medicated for pain per EMAR, voiced relief. Pt NPO at this time.  Will continue to monitor

## 2020-09-20 NOTE — ANESTHESIA PRE PROCEDURE
Known Allergies    Problem List:    Patient Active Problem List   Diagnosis Code    Acute cholecystitis K81.0       Past Medical History:        Diagnosis Date    Kidney stone        Past Surgical History:        Procedure Laterality Date    JOINT REPLACEMENT Bilateral     knees, left hip       Social History:    Social History     Tobacco Use    Smoking status: Never Smoker    Smokeless tobacco: Former User     Types: Chew   Substance Use Topics    Alcohol use: Not Currently     Comment: socially                                Counseling given: Not Answered      Vital Signs (Current):   Vitals:    09/20/20 0515 09/20/20 0715 09/20/20 0915 09/20/20 1127   BP: (!) 141/89 (!) 151/96 (!) 154/97 (!) 143/90   Pulse: 90 96 90 93   Resp: 18 18 20   Temp: 97.2 °F (36.2 °C) 97.9 °F (36.6 °C)  98.5 °F (36.9 °C)   TempSrc: Oral Oral  Temporal   SpO2: 93% 95%  94%   Weight: (!) 313 lb (142 kg)      Height: 6' 1\" (1.854 m)                                                 BP Readings from Last 3 Encounters:   09/20/20 (!) 143/90   09/20/20 (!) 150/97       NPO Status: Time of last liquid consumption: 0330                        Time of last solid consumption: 1700                        Date of last liquid consumption: 09/20/20                        Date of last solid food consumption: 09/19/20    BMI:   Wt Readings from Last 3 Encounters:   09/20/20 (!) 313 lb (142 kg)   09/19/20 300 lb (136.1 kg)     Body mass index is 41.3 kg/m².     CBC:   Lab Results   Component Value Date    WBC 11.4 09/19/2020    RBC 4.61 09/19/2020    HGB 13.8 09/19/2020    HCT 42.7 09/19/2020    MCV 92.8 09/19/2020    RDW 14.0 09/19/2020     09/19/2020       CMP:   Lab Results   Component Value Date     09/19/2020    K 4.2 09/19/2020     09/19/2020    CO2 27 09/19/2020    BUN 19 09/19/2020    CREATININE 0.85 09/19/2020    GFRAA >60.0 09/19/2020    LABGLOM >60.0 09/19/2020    GLUCOSE 152 09/19/2020    PROT 7.6 09/19/2020

## 2020-09-20 NOTE — PROGRESS NOTES
Dr. Valencia Patch vs patient at bedside in PACU; provided paper Rx for percocet, which was placed inside chart. He also called patient's wife, Valerie Rowland, and spoke with her regarding procedure & POC.

## 2020-09-20 NOTE — ED NOTES
TC contacted to initiate transfer to ED [facility].       Shanta Perales, RN  09/20/20 0421

## 2020-09-20 NOTE — ED PROVIDER NOTES
[facility] ED  EMERGENCY DEPARTMENT ENCOUNTER      Pt Name: Bryce Pinzon  MRN: [id number]  [place] 1965  Date of evaluation: 9/19/2020  Provider: Hermina Halsted, MD    CHIEF COMPLAINT       Chief Complaint   Patient presents with    Abdominal Pain     since 2pm, +n/v         HISTORY OF PRESENT ILLNESS   (Location/Symptom, Timing/Onset, Context/Setting, Quality, Duration, Modifying Factors, Severity)  Note limiting factors. Bryce Pinzon is a 50 y.o. male who has a history of kidney stones, presents to the emergency department for evaluation and management of abdominal pain with nausea and vomiting. He stated this afternoon around 2 PM.  The pain is generalized but mostly superior to his umbilicus but includes his lower abdomen as well. Former colonoscopy showed some polyps. This patient is being evaluated during the COVID-19 pandemic. HPI    Nursing Notes were reviewed. REVIEW OF SYSTEMS    (2-9 systems for level 4, 10 or more for level 5)       REVIEW OF SYSTEMS    Constitutional: Negative for fatigue and fever. Cardiovascular: Negative for chest pain, palpitations and leg swelling. Gastrointestinal: Positive for abdominal pain, nausea, vomiting, negative for abdominal distention,  Diarrhea    Genitourinary: Negative for difficulty urinating, dysuria, hematuria and urgency. Except as noted above the remainder of the review of systems was reviewed and negative. PASTMEDICAL HISTORY     Past Medical History:   Diagnosis Date    Kidney stone          SURGICAL HISTORY       Past Surgical History:   Procedure Laterality Date    JOINT REPLACEMENT Bilateral     knees, left hip         CURRENT MEDICATIONS       Previous Medications    FLUOXETINE (PROZAC) 40 MG CAPSULE    Take 2 capsules by mouth daily       ALLERGIES     Patient has no known allergies. FAMILY HISTORY     History reviewed. No pertinent family history.        SOCIAL HISTORY       Social History images are visualized and preliminarily interpreted by the emergency physician with the below findings:    Not indicated. Interpretation per the Radiologist below, if available at the time of this note:    CT ABDOMEN PELVIS W IV CONTRAST Additional Contrast? None    (Results Pending)     CT of the abdomen pelvis was performed with IV contrast.  A preliminary report was provided by stat read. Liver: Unremarkable. No mass. Gallbladder and bile ducts: Cholelithiasis identified, measuring on the order of 2 cm in size. Also identified is a gallbladder dilatation, measuring approximately 4.8 x 10 cm in size in the axial plane. If there is clinical concern for cholecystitis, consider correlation with gallbladder ultrasound for improved characterization. No clear signs of bowel bladder wall thickening is identified but consider correlation with an ultrasound of the patient complains of right upper quadrant pain. The gallstone is located within the neck of the gallbladder. Pancreas: Unremarkable. No mass. No ductal dilatation. Spleen: Unremarkable. No splenomegaly. Adrenals: Unremarkable. No mass. Kidneys and ureters. Multiple left-sided renal stones are identified measuring up to approximately 9 mm in diameter. Subcentimeter in size hypodensity present extending from the lower pole of the right kidneys statistically this is probably related to renal cyst but difficult to characterize secondary to the small size. Also identified is a nonobstructing right-sided renal stone measuring 2 mm in size. No solid mass. No hydronephrosis. Stomach and bowel: No abscess or phlegmon formation identified. Diverticulosis is present and there may be some faint diverticulitis within the sigmoid colon. Large amount of stool identified scattered throughout the colon. This could be related to constipation. No obstruction. No mucosal thickening. Appendix: No findings to suggest acute appendicitis.   Bladder: Unremarkable. No mass. Reproductive: Unremarkable as visualized. Intraperitoneal space: Unremarkable. No free air. No sigmoid significant fluid collection. Bones joints: No acute fracture. No dislocation. Multilevel mild degenerative changes are present in the spine. Soft tissues: Unremarkable. Vasculature: Vascular calcifications are present. No abdominal aortic aneurysm. Lymph nodes: Unremarkable. No enlarged lymph nodes. Impression: Cholelithiasis present. Also identified are nonobstructing renal stones. There is question of some mild diverticulitis. No signs of rupture. No abscess or phlegmon formation identified. ED BEDSIDE ULTRASOUND:   Performed by ED Physician - none    LABS:  Labs Reviewed   URINE RT REFLEX TO CULTURE - Abnormal; Notable for the following components:       Result Value    Blood, Urine TRACE (*)     All other components within normal limits   COMPREHENSIVE METABOLIC PANEL - Abnormal; Notable for the following components:    Glucose 152 (*)     Alb 4.7 (*)     All other components within normal limits   CBC WITH AUTO DIFFERENTIAL - Abnormal; Notable for the following components:    WBC 11.4 (*)     RBC 4.61 (*)     Hemoglobin 13.8 (*)     MCHC 32.2 (*)     Neutrophils Absolute 8.2 (*)     Monocytes Absolute 1.0 (*)     All other components within normal limits   CULTURE, BLOOD 2   CULTURE, BLOOD 1   LIPASE   TROPONIN   AMYLASE   LACTIC ACID, PLASMA   MICROSCOPIC URINALYSIS       All other labs were within normal range or not returned as of this dictation.     EMERGENCY DEPARTMENT COURSE and DIFFERENTIAL DIAGNOSIS/MDM:   Vitals:    Vitals:    09/19/20 2310 09/20/20 0053   BP: (!) 173/93 (!) 150/97   Pulse: 87 84   Resp: 18 20   Temp: 98.4 °F (36.9 °C)    TempSrc: Oral    SpO2: 97% 94%   Weight: 300 lb (136.1 kg)    Height: 6' 1\" (1.854 m)        Noted    MDM    CRITICAL CARE TIME   Total Critical Care time was 0 minutes      [facility]  ED Course as of Sep 20 0231   Sun Sep 20, 2020 0107 Consult surgery for cholecystitis. [MS]   0110 I spoke with Dr. Tyrell Kwong who agreed to be consulted on this case and to admit the patient to the hospitalist for further evaluation. [MS]   0111 I requested a call be placed to the transfer center to initiate transfer to [facility] to the hospitalist and to consult surgery. [MS]   0114 I spoke with the hospitalist Dr. Niko Flores who accepted patient for transfer for acute cholecystitis and early diverticulitis. [MS]   0230 Patient ambulated to the bathroom without difficulty. He appears and states that he is more comfortable after the dose of dilaudid was given. [MS]      ED Course User Index  [MS] Rere Montgomery MD       CONSULTS:  None    PROCEDURES:  Unless otherwise noted below, none     Procedures      Summation    Chevy Ibanez is a 50 y.o. male who has a history of kidney stones was transferred to [facility] to the medical surgical service with consultation with surgery for acute cholecystitis and cholelithiasis and early diverticulitis. Patient presented with abdominal pain that is intractable to pain medications. Antibiotics were given. Patient was given fluids and bowel rest.    He is  well hydrated, nontoxic, hemodynamically stable and satisfactory for discharge for ground transport. .     Findings discussed at length with patient. Patient Course:   ED Course as of Sep 20 0231   Sun Sep 20, 2020   0107 Consult surgery for cholecystitis. [MS]   0110 I spoke with Dr. Tyrell Kwong who agreed to be consulted on this case and to admit the patient to the hospitalist for further evaluation. [MS]   0111 I requested a call be placed to the transfer center to initiate transfer to [facility] to the hospitalist and to consult surgery. [MS]   0114 I spoke with the hospitalist Dr. Niko Flores who accepted patient for transfer for acute cholecystitis and early diverticulitis. [MS]   0230 Patient ambulated to the bathroom without difficulty. He appears and states that he is more comfortable after the dose of dilaudid was given. [MS]      ED Course User Index  [MS] Dana Posey MD         ED Medicationsadministered this visit:    Medications   sodium chloride flush 0.9 % injection 3 mL (3 mLs Intravenous Given 9/19/20 2328)   0.9 % sodium chloride bolus (0 mLs Intravenous Stopped 9/20/20 0118)   ondansetron (ZOFRAN) injection 4 mg (4 mg Intravenous Given 9/19/20 2327)   morphine injection 4 mg (4 mg Intravenous Given 9/19/20 2327)   iopamidol (ISOVUE-370) 76 % injection 100 mL (100 mLs Intravenous Given 9/20/20 0007)   morphine injection 4 mg (4 mg Intravenous Given 9/20/20 0047)   ondansetron (ZOFRAN) injection 4 mg (4 mg Intravenous Given 9/20/20 0047)   piperacillin-tazobactam (ZOSYN) 3.375 g in dextrose 5 % 50 mL IVPB (mini-bag) (0 g Intravenous Stopped 9/20/20 0154)   HYDROmorphone (DILAUDID) injection 0.5 mg (0.5 mg Intravenous Given 9/20/20 0135)       New Prescriptions from this visit:    New Prescriptions    No medications on file       Follow-up:  No follow-up provider specified. Final Impression:   1. Acute cholecystitis    2. Diverticulitis of colon    3. Essential hypertension    4. Calculus of gallbladder without cholecystitis without obstruction               (Please note that portions of this note werecompleted with a voice recognition program.  Efforts were made to edit the dictations but occasionally words are mis-transcribed.)    FINAL IMPRESSION      1. Acute cholecystitis    2. Diverticulitis of colon    3. Essential hypertension    4. Calculus of gallbladder without cholecystitis without obstruction          DISPOSITION/PLAN   DISPOSITION  09/20/2020 01:12:32 AM      PATIENT REFERRED TO:  No follow-up provider specified.     DISCHARGE MEDICATIONS:  New Prescriptions    No medications on file          (Please note that portions of this note were completed with a voice recognition program.  Efforts were made to edit the dictations but occasionally words are mis-transcribed.)    Katarzyna Shah MD (electronically signed)  Attending Emergency Physician            Katarzyna Shah MD  09/20/20 [id number]

## 2020-09-20 NOTE — ANESTHESIA PROCEDURE NOTES
Peripheral Block    Patient location during procedure: pre-op  Start time: 9/20/2020 11:48 AM  End time: 9/20/2020 11:56 AM  Staffing  Anesthesiologist: Nadine Sarabia MD  Performed: anesthesiologist   Preanesthetic Checklist  Completed: patient identified, site marked, surgical consent, pre-op evaluation, timeout performed, IV checked, risks and benefits discussed, monitors and equipment checked, anesthesia consent given, oxygen available and patient being monitored  Peripheral Block  Patient position: sitting  Prep: ChloraPrep  Patient monitoring: cardiac monitor, continuous pulse ox, frequent blood pressure checks and IV access  Block type: Erector spinae (at T8 vertebra level)  Laterality: bilateral  Injection technique: single-shot  Procedures: ultrasound guided  Local infiltration: bupivacaine, lidocaine and decadron  Infiltration strength: 0.25 %  Dose: 30 mL  Provider prep: mask and sterile gloves (Sterile probe cover)  Local infiltration: bupivacaine, lidocaine and decadron  Needle  Needle type: combined needle/nerve stimulator   Needle gauge: 21 G  Needle length: 10 cm  Needle localization: anatomical landmarks and ultrasound guidance  Assessment  Injection assessment: negative aspiration for heme, no paresthesia on injection and local visualized surrounding nerve on ultrasound  Paresthesia pain: immediately resolved  Slow fractionated injection: yes  Hemodynamics: stable  Additional Notes  Ultrasound image printed and saved in patient chart.     Sterile probe cover used    Reason for block: post-op pain management and at surgeon's request

## 2020-09-20 NOTE — H&P
abused: Not on file     Physically abused: Not on file     Forced sexual activity: Not on file   Other Topics Concern    Not on file   Social History Narrative    Not on file     No family history on file. No current facility-administered medications on file prior to encounter. Current Outpatient Medications on File Prior to Encounter   Medication Sig Dispense Refill    FLUoxetine (PROZAC) 40 MG capsule Take 2 capsules by mouth daily       Review of Systems   Constitutional: Negative for activity change and fatigue. HENT: Negative for congestion and dental problem. Eyes: Negative for discharge. Respiratory: Negative for apnea. Cardiovascular: Negative for chest pain. Gastrointestinal: Positive for abdominal pain, nausea and vomiting. Genitourinary: Negative for difficulty urinating. Allergic/Immunologic: Negative for environmental allergies. Neurological: Negative for dizziness. Psychiatric/Behavioral: Negative for agitation. Physical Exam  Constitutional:       Appearance: Normal appearance. HENT:      Head: Normocephalic and atraumatic. Left Ear: Tympanic membrane normal.      Mouth/Throat:      Pharynx: No oropharyngeal exudate or posterior oropharyngeal erythema. Eyes:      General:         Left eye: No discharge. Extraocular Movements: Extraocular movements intact. Pupils: Pupils are equal, round, and reactive to light. Cardiovascular:      Rate and Rhythm: Normal rate and regular rhythm. Heart sounds: No murmur. Pulmonary:      Effort: No respiratory distress. Breath sounds: No wheezing. Abdominal:      General: There is distension. Tenderness: There is guarding. Skin:     General: Skin is warm. Neurological:      Mental Status: He is alert. Cranial Nerves: No cranial nerve deficit. There were no vitals taken for this visit.     Lab Results   Component Value Date    WBC 11.4 (H) 09/19/2020    HGB 13.8 (L) 09/19/2020    HCT 42.7 09/19/2020    MCV 92.8 09/19/2020     09/19/2020     Lab Results   Component Value Date     09/19/2020    K 4.2 09/19/2020     09/19/2020    CO2 27 09/19/2020    BUN 19 09/19/2020    CREATININE 0.85 09/19/2020    GLUCOSE 152 09/19/2020    CALCIUM 9.7 09/19/2020        1) severe RUQ abd pain r/o acute cholecystiits  2) acute diverticulitis  C/w IV  Zosyn  Pain control  Ruq u/s  Surgical eval  IVF  NPO  3) DVT ppx

## 2020-09-21 NOTE — OP NOTE
[address]                      [address], [address] [facility]                                OPERATIVE REPORT    PATIENT NAME: Lu Vega                     :        1965  MED REC NO:   [id number]                            [facility]  ACCOUNT NO:   [de-identified]                           ADMIT DATE: 2020  PROVIDER:     Jessica Drummond MD    DATE OF PROCEDURE:  2020    PREOPERATIVE DIAGNOSIS:  Acute cholecystitis. POSTOPERATIVE DIAGNOSIS:  Acute cholecystitis. PROCEDURES PERFORMED:  1. Laparoscopic cholecystectomy. 2.  Intraoperative cholangiogram.    SURGEON:  Jessica Drummond MD    ASSISTANT:  Ms. Janay Bravo. ANESTHESIA:  1. General endotracheal anesthesia. 2.  Erector spinae block. ESTIMATED BLOOD LOSS:  50 mL. SPECIMEN:  Gallbladder. COMPLICATIONS:  None. INDICATIONS:  A 53-year-old male with a clinical history, physical exam  and ultrasound findings consistent with acute cholecystitis. Risks and  benefits of laparoscopic possible open cholecystectomy described. Risks  of the procedure including infection, bleeding, damage to the common  bile duct, bile leak, reoperation and failure to relieve symptoms all  addressed. Despite these risks, he wished to proceed. Proper consent  obtained. OPERATIVE PROCEDURE:  He was taken to the operating room, placed in the  supine position. General endotracheal anesthesia was administered. A  regional block was placed preoperatively. His abdomen was prepped and  draped with a ChloraPrep-containing solution. He received Zosyn  preoperatively. A time-out was taken for appropriate verification. A 5-mm right upper quadrant incision was made. An optical trocar was  placed and pneumoperitoneum was established. The periumbilical and two  other right upper quadrant ports were placed under direct visualization. The gallbladder was markedly distended and acutely inflamed.

## 2020-09-25 LAB
BLOOD CULTURE, ROUTINE: NORMAL
CULTURE, BLOOD 2: NORMAL

## 2020-09-29 ENCOUNTER — OFFICE VISIT (OUTPATIENT)
Dept: SURGERY | Age: 55
End: 2020-09-29

## 2020-09-29 VITALS
TEMPERATURE: 97.4 F | HEART RATE: 94 BPM | WEIGHT: 315 LBS | HEIGHT: 73 IN | OXYGEN SATURATION: 97 % | BODY MASS INDEX: 41.75 KG/M2

## 2020-09-29 PROCEDURE — 99024 POSTOP FOLLOW-UP VISIT: CPT | Performed by: COLON & RECTAL SURGERY

## 2020-09-29 NOTE — PROGRESS NOTES
Subjective:      Patient ID: Ursula Alvarado is a 50 y.o. male who presents for:  Chief Complaint   Patient presents with    Post-Op Check       He returns to the office 9 days out from a laparoscopic cholecystectomy for acute cholecystitis. He has been doing well. Denies pain. Denies fever. Bowels are working. Denies nausea or vomiting.     Final histology reviewed and benign      Past Medical History:   Diagnosis Date    Kidney stone      Past Surgical History:   Procedure Laterality Date    CHOLECYSTECTOMY, LAPAROSCOPIC N/A 9/20/2020    CHOLECYSTECTOMY LAPAROSCOPIC WITH INTROPERTIVE CHOLANGIOGRAM performed by Blanca Miller MD at [address] Bilateral     knees, left hip     Social History     Socioeconomic History    Marital status:      Spouse name: Not on file    Number of children: Not on file    Years of education: Not on file    Highest education level: Not on file   Occupational History    Not on file   Social Needs    Financial resource strain: Not on file    Food insecurity     Worry: Not on file     Inability: Not on file    Transportation needs     Medical: Not on file     Non-medical: Not on file   Tobacco Use    Smoking status: Never Smoker    Smokeless tobacco: Former User     Types: Chew   Substance and Sexual Activity    Alcohol use: Not Currently     Comment: socially    Drug use: Never    Sexual activity: Yes     Partners: Female   Lifestyle    Physical activity     Days per week: Not on file     Minutes per session: Not on file    Stress: Not on file   Relationships    Social connections     Talks on phone: Not on file     Gets together: Not on file     Attends [employer] service: Not on file     Active member of club or organization: Not on file     Attends meetings of clubs or organizations: Not on file     Relationship status: Not on file    Intimate partner violence     Fear of current or ex partner: Not on file     Emotionally abused: Not on file     Physically abused: Not on file     Forced sexual activity: Not on file   Other Topics Concern    Not on file   Social History Narrative    Not on file     No family history on file. Allergies:  Patient has no known allergies. Review of Systems    Objective:    Pulse 94   Temp 97.4 °F (36.3 °C) (Temporal)   Ht 6' 1\" (1.854 m)   Wt (!) 323 lb (146.5 kg)   SpO2 97%   BMI 42.61 kg/m²     Physical Exam  On exam his abdomen is soft. Incisions are all healed well. Staples were removed. Steri-Strips were placed for epithelial support. No signs of infection or evidence of hernia. Assessment/Plan:          Diagnosis Orders   1. Acute cholecystitis       He will return to work on October 5 without restrictions. If there are any problems or questions that arise or any way I can be of further assistance, I asked him to call. Histology reviewed            Please note this report has beenpartially produced using speech recognition software and may cause contain errors related to that system including grammar, punctuation and spelling as well as words and phrases that may seem inappropriate.  If there arequestions or concerns please feel free to contact me to clarify

## 2020-09-29 NOTE — LETTER
[facility]  [address]  [facility] [address]  Phone: [phone]  Fax: [phone]    Veronica Mendoza MD        September 29, 2020     Patient: Alli Campbell   YOB: 1965   Date of Visit: 9/29/2020       To Whom It May Concern: It is my medical opinion that Alissa Grant may return to work on October 5th, 2020 without restrictions. If you have any questions or concerns, please don't hesitate to call.     Sincerely,        Veronica Mendoza MD

## 2023-02-14 ENCOUNTER — HOSPITAL ENCOUNTER (EMERGENCY)
Age: 58
Discharge: HOME | End: 2023-02-14
Attending: EMERGENCY MEDICINE
Payer: COMMERCIAL

## 2023-02-14 ENCOUNTER — APPOINTMENT (OUTPATIENT)
Dept: GENERAL RADIOLOGY | Age: 58
End: 2023-02-14
Payer: COMMERCIAL

## 2023-02-14 VITALS
DIASTOLIC BLOOD PRESSURE: 99 MMHG | RESPIRATION RATE: 21 BRPM | HEIGHT: 73 IN | TEMPERATURE: 98.3 F | SYSTOLIC BLOOD PRESSURE: 168 MMHG | WEIGHT: 315 LBS | OXYGEN SATURATION: 97 % | HEART RATE: 95 BPM | BODY MASS INDEX: 41.75 KG/M2

## 2023-02-14 DIAGNOSIS — M25.512 ACUTE PAIN OF LEFT SHOULDER: ICD-10-CM

## 2023-02-14 DIAGNOSIS — F32.A DEPRESSION, UNSPECIFIED DEPRESSION TYPE: Primary | ICD-10-CM

## 2023-02-14 LAB
ANION GAP SERPL CALCULATED.3IONS-SCNC: 11 MEQ/L (ref 9–15)
BASOPHILS ABSOLUTE: 0.1 K/UL (ref 0–0.1)
BASOPHILS RELATIVE PERCENT: 0.9 % (ref 0.2–1.2)
BILIRUBIN URINE: NEGATIVE
BLOOD, URINE: NEGATIVE
BUN BLDV-MCNC: 19 MG/DL (ref 6–20)
CALCIUM SERPL-MCNC: 9.6 MG/DL (ref 8.5–9.9)
CHLORIDE BLD-SCNC: 106 MEQ/L (ref 95–107)
CLARITY: CLEAR
CO2: 26 MEQ/L (ref 20–31)
COLOR: YELLOW
CREAT SERPL-MCNC: 0.75 MG/DL (ref 0.7–1.2)
EKG ATRIAL RATE: 91 BPM
EKG P AXIS: 55 DEGREES
EKG P-R INTERVAL: 148 MS
EKG Q-T INTERVAL: 380 MS
EKG QRS DURATION: 98 MS
EKG QTC CALCULATION (BAZETT): 467 MS
EKG R AXIS: 23 DEGREES
EKG T AXIS: 25 DEGREES
EKG VENTRICULAR RATE: 91 BPM
EOSINOPHILS ABSOLUTE: 0.4 K/UL (ref 0–0.5)
EOSINOPHILS RELATIVE PERCENT: 5.2 % (ref 0.8–7)
GFR SERPL CREATININE-BSD FRML MDRD: >60 ML/MIN/{1.73_M2}
GLUCOSE BLD-MCNC: 99 MG/DL (ref 70–99)
GLUCOSE URINE: NEGATIVE MG/DL
HCT VFR BLD CALC: 43 % (ref 42–52)
HEMOGLOBIN: 13.8 G/DL (ref 13.7–17.5)
IMMATURE GRANULOCYTES #: 0 K/UL
IMMATURE GRANULOCYTES %: 0.1 %
KETONES, URINE: NEGATIVE MG/DL
LEUKOCYTE ESTERASE, URINE: NEGATIVE
LYMPHOCYTES ABSOLUTE: 1.9 K/UL (ref 1.3–3.6)
LYMPHOCYTES RELATIVE PERCENT: 25 %
MCH RBC QN AUTO: 29.3 PG (ref 25.7–32.2)
MCHC RBC AUTO-ENTMCNC: 32.1 % (ref 32.3–36.5)
MCV RBC AUTO: 91.3 FL (ref 79–92.2)
MONOCYTES ABSOLUTE: 0.9 K/UL (ref 0.3–0.8)
MONOCYTES RELATIVE PERCENT: 12.3 % (ref 5.3–12.2)
NEUTROPHILS ABSOLUTE: 4.2 K/UL (ref 1.8–5.4)
NEUTROPHILS RELATIVE PERCENT: 56.5 % (ref 34–67.9)
NITRITE, URINE: NEGATIVE
PDW BLD-RTO: 13.1 % (ref 11.6–14.4)
PH UA: 5.5 (ref 5–9)
PLATELET # BLD: 285 K/UL (ref 163–337)
POTASSIUM SERPL-SCNC: 4 MEQ/L (ref 3.4–4.9)
PROTEIN UA: NEGATIVE MG/DL
RBC # BLD: 4.71 M/UL (ref 4.63–6.08)
SODIUM BLD-SCNC: 143 MEQ/L (ref 135–144)
SPECIFIC GRAVITY UA: 1.02 (ref 1–1.03)
TROPONIN: <0.01 NG/ML (ref 0–0.01)
URINE REFLEX TO CULTURE: NORMAL
UROBILINOGEN, URINE: 0.2 E.U./DL
WBC # BLD: 7.5 K/UL (ref 4.2–9)

## 2023-02-14 PROCEDURE — 80048 BASIC METABOLIC PNL TOTAL CA: CPT

## 2023-02-14 PROCEDURE — 2580000003 HC RX 258: Performed by: EMERGENCY MEDICINE

## 2023-02-14 PROCEDURE — 85025 COMPLETE CBC W/AUTO DIFF WBC: CPT

## 2023-02-14 PROCEDURE — 81003 URINALYSIS AUTO W/O SCOPE: CPT

## 2023-02-14 PROCEDURE — 73030 X-RAY EXAM OF SHOULDER: CPT

## 2023-02-14 PROCEDURE — 96360 HYDRATION IV INFUSION INIT: CPT

## 2023-02-14 PROCEDURE — 84484 ASSAY OF TROPONIN QUANT: CPT

## 2023-02-14 PROCEDURE — 93005 ELECTROCARDIOGRAM TRACING: CPT

## 2023-02-14 PROCEDURE — 99285 EMERGENCY DEPT VISIT HI MDM: CPT

## 2023-02-14 PROCEDURE — 71046 X-RAY EXAM CHEST 2 VIEWS: CPT

## 2023-02-14 PROCEDURE — 36415 COLL VENOUS BLD VENIPUNCTURE: CPT

## 2023-02-14 RX ORDER — TAMSULOSIN HYDROCHLORIDE 0.4 MG/1
0.4 CAPSULE ORAL DAILY
COMMUNITY
Start: 2022-10-04

## 2023-02-14 RX ORDER — 0.9 % SODIUM CHLORIDE 0.9 %
1000 INTRAVENOUS SOLUTION INTRAVENOUS ONCE
Status: COMPLETED | OUTPATIENT
Start: 2023-02-14 | End: 2023-02-14

## 2023-02-14 RX ADMIN — SODIUM CHLORIDE 1000 ML: 9 INJECTION, SOLUTION INTRAVENOUS at 09:57

## 2023-02-14 ASSESSMENT — ENCOUNTER SYMPTOMS
VOMITING: 0
ABDOMINAL PAIN: 0
NAUSEA: 0
BACK PAIN: 0
EYE DISCHARGE: 0
SORE THROAT: 0
SHORTNESS OF BREATH: 0
COUGH: 0
EYE REDNESS: 0

## 2023-02-14 ASSESSMENT — PAIN - FUNCTIONAL ASSESSMENT: PAIN_FUNCTIONAL_ASSESSMENT: NONE - DENIES PAIN

## 2023-02-14 NOTE — ED PROVIDER NOTES
[facility] ED  EMERGENCY DEPARTMENT ENCOUNTER      Pt Name: Nicola Rockwell  MRN: [id number]  [place] 1965  Date of evaluation: 2/14/2023  Provider: Dereck Jamison DO        HISTORY OF PRESENT ILLNESS    Nicola Rockwell is a 56 y.o. male per chart review has ah/o kidney stone. He presents with left shoulder pain that radiates to his left hand. Denies any injury. The history is provided by the patient. Shoulder Problem  Location:  Shoulder  Shoulder location:  L shoulder  Injury: no    Pain details:     Quality:  Aching    Radiates to:  L upper arm, L forearm, L arm and L fingers    Severity:  Mild    Onset quality:  Sudden    Timing:  Constant    Progression:  Waxing and waning  Dislocation: no    Foreign body present:  No foreign bodies  Prior injury to area:  Unable to specify  Relieved by:  Nothing  Worsened by:  Nothing  Ineffective treatments:  None tried  Associated symptoms: tingling    Associated symptoms: no back pain, no fever and no neck pain           REVIEW OF SYSTEMS       Review of Systems   Constitutional:  Negative for chills and fever. HENT:  Negative for ear pain and sore throat. Eyes:  Negative for discharge and redness. Respiratory:  Negative for cough and shortness of breath. Cardiovascular:  Negative for chest pain and palpitations. Gastrointestinal:  Negative for abdominal pain, nausea and vomiting. Genitourinary:  Negative for difficulty urinating and dysuria. Musculoskeletal:  Positive for arthralgias. Negative for back pain and neck pain. Skin:  Negative for rash and wound. Neurological:  Negative for dizziness and syncope. Psychiatric/Behavioral:  Negative for confusion. The patient is not nervous/anxious. All other systems reviewed and are negative. Except as noted above the remainder of the review of systems was reviewed and negative.        PAST MEDICAL HISTORY     Past Medical History:   Diagnosis Date    Kidney stone          SURGICAL HISTORY       Past Surgical History:   Procedure Laterality Date    CHOLECYSTECTOMY, LAPAROSCOPIC N/A 9/20/2020    CHOLECYSTECTOMY LAPAROSCOPIC WITH INTROPERTIVE CHOLANGIOGRAM performed by Lyndsay Davis MD at [address] [place] [address]. Bilateral     knees, left hip         CURRENT MEDICATIONS       Discharge Medication List as of 2/14/2023 10:44 AM        CONTINUE these medications which have NOT CHANGED    Details   tamsulosin (FLOMAX) 0.4 MG capsule Take 0.4 mg by mouth dailyHistorical Med      FLUoxetine (PROZAC) 40 MG capsule Take 2 capsules by mouth dailyHistorical Med             ALLERGIES     Patient has no known allergies. FAMILY HISTORY     History reviewed. No pertinent family history. SOCIAL HISTORY       Social History     Socioeconomic History    Marital status:      Spouse name: None    Number of children: None    Years of education: None    Highest education level: None   Tobacco Use    Smoking status: Never    Smokeless tobacco: Former     Types: Chew   Substance and Sexual Activity    Alcohol use: Not Currently     Comment: socially    Drug use: Never    Sexual activity: Yes     Partners: Female         PHYSICAL EXAM       ED Triage Vitals [02/14/23 0936]   BP Temp Temp Source Heart Rate Resp SpO2 Height Weight   (!) 169/104 98.3 °F (36.8 °C) Oral 95 21 97 % 6' 1\" (1.854 m) (!) 325 lb (147.4 kg)       Physical Exam  Vitals and nursing note reviewed. Constitutional:       Appearance: Normal appearance. He is obese. HENT:      Head: Normocephalic and atraumatic. Right Ear: Tympanic membrane normal.      Left Ear: Tympanic membrane normal.      Nose: Nose normal.      Mouth/Throat:      Mouth: Mucous membranes are moist.      Pharynx: Oropharynx is clear. Eyes:      General: Lids are normal.      Extraocular Movements: Extraocular movements intact. Conjunctiva/sclera: Conjunctivae normal.      Pupils: Pupils are equal, round, and reactive to light. Cardiovascular:      Rate and Rhythm: Normal rate and regular rhythm. Pulses: Normal pulses. Heart sounds: Normal heart sounds. Pulmonary:      Effort: Pulmonary effort is normal.      Breath sounds: Normal breath sounds. Abdominal:      General: Abdomen is flat. Bowel sounds are normal.      Palpations: Abdomen is soft. Musculoskeletal:         General: Normal range of motion. Cervical back: Full passive range of motion without pain, normal range of motion and neck supple. Skin:     General: Skin is warm. Capillary Refill: Capillary refill takes less than 2 seconds. Neurological:      General: No focal deficit present. Mental Status: He is alert and oriented to person, place, and time. Deep Tendon Reflexes: Reflexes are normal and symmetric. Psychiatric:         Attention and Perception: Attention and perception normal.         Mood and Affect: Mood normal.         Behavior: Behavior normal. Behavior is cooperative. LABS:  Labs Reviewed   CBC WITH AUTO DIFFERENTIAL - Abnormal; Notable for the following components:       Result Value    MCHC 32.1 (*)     Monocytes % 12.3 (*)     Monocytes Absolute 0.9 (*)     All other components within normal limits   BASIC METABOLIC PANEL   URINALYSIS WITH REFLEX TO CULTURE   TROPONIN         MDM:   Vitals:    Vitals:    02/14/23 0936 02/14/23 1000 02/14/23 1045   BP: (!) 169/104  (!) 168/99   Pulse: 95 95    Resp: 21     Temp: 98.3 °F (36.8 °C)     TempSrc: Oral     SpO2: 97%     Weight: (!) 325 lb (147.4 kg)     Height: 6' 1\" (1.854 m)         MDM  Number of Diagnoses or Management Options  Acute pain of left shoulder  Depression, unspecified depression type  Diagnosis management comments: Patient presents with left shoulder pain on the left side. Labs, CXR and EKG ordered. The xrays were reviewed and do not show any acute changes  The patient was given 1liter NS IVF bolus. He feels better and will be discharged home.   He will follow up in 2 days with his primary care doctor. Amount and/or Complexity of Data Reviewed  Clinical lab tests: ordered and reviewed  Tests in the radiology section of CPT®: ordered and reviewed         XR CHEST (2 VW)   Final Result   No acute process. XR SHOULDER LEFT (MIN 2 VIEWS)   Final Result   There is no acute fracture dislocation of the left shoulder      Mild degenerative changes of the glenohumeral joint               EKG Interpretation    Interpreted by emergency department physician    Rhythm: normal sinus   Rate: normal  Axis: normal  Ectopy: none  Conduction: normal  ST Segments: no acute change  T Waves: no acute change  Q Waves: none    Clinical Impression: no acute changes    XANDER RILEY DO     The lab results, radiology and test results were reviewed with the patient and family. The patient will follow up in 2 days with their primary care doctor. If their symptoms change or get worse they will return to the ER. CRITICAL CARE TIME   Total CriticalCare time was 0 minutes, excluding separately reportable procedures. There was a high probability of clinically significant/life threatening deterioration in the patient's condition which required my urgent intervention. PROCEDURES:  Unlessotherwise noted below, none     Procedures      FINAL IMPRESSION      1. Depression, unspecified depression type    2.  Acute pain of left shoulder          DISPOSITION/PLAN   DISPOSITION Decision To Discharge 02/14/2023 10:30:14 AM          XANDER Garduno DO (electronically signed)  Attending Emergency Physician          Maya Salazar DO  02/17/23 6861

## 2023-02-14 NOTE — ED TRIAGE NOTES
Patient in with hypertension and left arm numbness . He also states he feels a lot of head preassure.  Denies chest pain or SOB

## (undated) DEVICE — GOWN,AURORA,NONREINFORCED,LARGE: Brand: MEDLINE

## (undated) DEVICE — SYRINGE MED 30ML STD CLR PLAS LUERLOCK TIP N CTRL DISP

## (undated) DEVICE — TOWEL,OR,DSP,ST,BLUE,STD,4/PK,20PK/CS: Brand: MEDLINE

## (undated) DEVICE — TROCAR: Brand: KII FIOS FIRST ENTRY

## (undated) DEVICE — INTENDED FOR TISSUE SEPARATION, AND OTHER PROCEDURES THAT REQUIRE A SHARP SURGICAL BLADE TO PUNCTURE OR CUT.: Brand: BARD-PARKER ® CARBON RIB-BACK BLADES

## (undated) DEVICE — ELECTRODE LAP L36CM PTFE WIRE J HK CLEANCOAT

## (undated) DEVICE — DRAPE EQUIP TRNSPRT CONTAINMENT FOR BK TAB

## (undated) DEVICE — PACK,SET UP,DRAPE: Brand: MEDLINE

## (undated) DEVICE — WARMER SCP 2 ANTIFOG LAP DISP

## (undated) DEVICE — Z DUPLICATE USE 2431315 SET INSUF TBNG HI FLO W/ SMK EVAC FOR PNEUMOCLEAR

## (undated) DEVICE — Device

## (undated) DEVICE — LAPAROSCOPIC TROCAR SLEEVE/SINGLE USE: Brand: KII® OPTICAL ACCESS SYSTEM

## (undated) DEVICE — LABEL MED MINI W/ MARKER

## (undated) DEVICE — BAG SPEC REM 224ML W4XL6IN DIA10MM 1 HND GYN DISP ENDOPCH

## (undated) DEVICE — ELECTRODE PT RET AD L9FT HI MOIST COND ADH HYDRGEL CORDED

## (undated) DEVICE — 3M™ STERI-STRIP™ REINFORCED ADHESIVE SKIN CLOSURES, R1547, 1/2 IN X 4 IN (12 MM X 100 MM), 6 STRIPS/ENVELOPE: Brand: 3M™ STERI-STRIP™

## (undated) DEVICE — DRAPE,LAP,CHOLE,W/TROUGHS,STERILE: Brand: MEDLINE

## (undated) DEVICE — KIT,ANTI FOG,W/SPONGE & FLUID,SOFT PACK: Brand: MEDLINE

## (undated) DEVICE — Device: Brand: MEDEX

## (undated) DEVICE — OPEN-END URETERAL CATHETER: Brand: COOK

## (undated) DEVICE — SUTURE MCRYL SZ 4-0 L27IN ABSRB UD L19MM PS-2 1/2 CIR PRIM Y426H

## (undated) DEVICE — BANDAGE WND ADH LF FLX CURAD 3/4X3IN

## (undated) DEVICE — PENCIL SMOKE EVAC PUSH BUTTON COATED

## (undated) DEVICE — GAUZE,SPONGE,4"X4",16PLY,XRAY,STRL,LF: Brand: MEDLINE

## (undated) DEVICE — 4-PORT MANIFOLD: Brand: NEPTUNE 2

## (undated) DEVICE — BANDAGE ADH W2XL4IN NITRL FAB STRP CURAD

## (undated) DEVICE — GOWN,AURORA,NONRNF,XL,30/CS: Brand: MEDLINE

## (undated) DEVICE — COUNTER NDL 40 COUNT HLD 70 FOAM BLK ADH W/ MAG

## (undated) DEVICE — APPLICATOR MEDICATED 26 CC SOLUTION HI LT ORNG CHLORAPREP

## (undated) DEVICE — GLOVE ORANGE PI 7 1/2   MSG9075

## (undated) DEVICE — SUTURE SZ 0 27IN 5/8 CIR UR-6  TAPER PT VIOLET ABSRB VICRYL J603H

## (undated) DEVICE — APPLIER CLP M L L11.4IN DIA10MM ENDOSCP ROT MULT FOR LIG

## (undated) DEVICE — TROCAR: Brand: KII® SLEEVE

## (undated) DEVICE — CATHETER IV 12GA L76MM EXTN DIA2.8MM FEP POLYMER THRM

## (undated) DEVICE — DRAPE SURG W41XL74IN CLR FULL SZ C ARM 3 ADH POLY STRP E

## (undated) DEVICE — COTTON BALL ST